# Patient Record
Sex: FEMALE | Race: BLACK OR AFRICAN AMERICAN | NOT HISPANIC OR LATINO | Employment: OTHER | URBAN - METROPOLITAN AREA
[De-identification: names, ages, dates, MRNs, and addresses within clinical notes are randomized per-mention and may not be internally consistent; named-entity substitution may affect disease eponyms.]

---

## 2017-02-15 ENCOUNTER — TELEPHONE (OUTPATIENT)
Dept: GASTROENTEROLOGY | Facility: CLINIC | Age: 67
End: 2017-02-15

## 2017-02-15 NOTE — TELEPHONE ENCOUNTER
Tried calling patient in regards to scheduling Colonoscopy,but the number that we have can not leave messages. Last procedure was 12/29/2014 pt was due for f/u 12/2016 for surveillance.

## 2017-04-18 ENCOUNTER — TELEPHONE (OUTPATIENT)
Dept: GASTROENTEROLOGY | Facility: CLINIC | Age: 67
End: 2017-04-18

## 2017-04-18 NOTE — TELEPHONE ENCOUNTER
Spoke with patient in regards to getting scheduled for follow up Colonoscopy from 12/2014. Patient stated that she is constipated and would like to have an office visit first with Dr. Perez. Patient is scheduled for an office visit on 05/05/17.

## 2017-05-05 ENCOUNTER — TELEPHONE (OUTPATIENT)
Dept: GASTROENTEROLOGY | Facility: CLINIC | Age: 67
End: 2017-05-05

## 2017-05-05 ENCOUNTER — OFFICE VISIT (OUTPATIENT)
Dept: GASTROENTEROLOGY | Facility: CLINIC | Age: 67
End: 2017-05-05
Payer: MEDICARE

## 2017-05-05 VITALS
HEIGHT: 64 IN | SYSTOLIC BLOOD PRESSURE: 150 MMHG | DIASTOLIC BLOOD PRESSURE: 75 MMHG | WEIGHT: 245 LBS | HEART RATE: 80 BPM | BODY MASS INDEX: 41.83 KG/M2

## 2017-05-05 DIAGNOSIS — Z86.010 HISTORY OF ADENOMATOUS POLYP OF COLON: Primary | ICD-10-CM

## 2017-05-05 DIAGNOSIS — K59.00 CONSTIPATION, UNSPECIFIED CONSTIPATION TYPE: Chronic | ICD-10-CM

## 2017-05-05 DIAGNOSIS — K59.00 CONSTIPATION, UNSPECIFIED CONSTIPATION TYPE: Primary | ICD-10-CM

## 2017-05-05 PROBLEM — E03.9 HYPOTHYROIDISM (ACQUIRED): Chronic | Status: ACTIVE | Noted: 2017-05-05

## 2017-05-05 PROBLEM — I10 HYPERTENSION, ESSENTIAL: Chronic | Status: ACTIVE | Noted: 2017-05-05

## 2017-05-05 PROCEDURE — 99999 PR PBB SHADOW E&M-EST. PATIENT-LVL III: CPT | Mod: PBBFAC,,, | Performed by: INTERNAL MEDICINE

## 2017-05-05 PROCEDURE — 3078F DIAST BP <80 MM HG: CPT | Mod: S$GLB,,, | Performed by: INTERNAL MEDICINE

## 2017-05-05 PROCEDURE — 1159F MED LIST DOCD IN RCRD: CPT | Mod: S$GLB,,, | Performed by: INTERNAL MEDICINE

## 2017-05-05 PROCEDURE — 3077F SYST BP >= 140 MM HG: CPT | Mod: S$GLB,,, | Performed by: INTERNAL MEDICINE

## 2017-05-05 PROCEDURE — 1157F ADVNC CARE PLAN IN RCRD: CPT | Mod: 8P,S$GLB,, | Performed by: INTERNAL MEDICINE

## 2017-05-05 PROCEDURE — 1160F RVW MEDS BY RX/DR IN RCRD: CPT | Mod: S$GLB,,, | Performed by: INTERNAL MEDICINE

## 2017-05-05 PROCEDURE — 1126F AMNT PAIN NOTED NONE PRSNT: CPT | Mod: S$GLB,,, | Performed by: INTERNAL MEDICINE

## 2017-05-05 PROCEDURE — 99214 OFFICE O/P EST MOD 30 MIN: CPT | Mod: S$GLB,,, | Performed by: INTERNAL MEDICINE

## 2017-05-05 NOTE — MR AVS SNAPSHOT
[facility]  [facility] [address] [place] [address]  Phone: [phone]  Fax: [phone]                  Varun Aleman   2017 1:20 PM   Office Visit    Description:  Female : 1950   Provider:  Delroy Perez MD   Department:  [facility]           Reason for Visit     Constipation     Colonoscopy           Diagnoses this Visit        Comments    History of adenomatous polyp of colon    -  Primary     Constipation, unspecified constipation type     Chronic problem, not tolerant of MiraLAX, not doing very well with only increased soluble fiber.           To Do List           Goals (5 Years of Data)     None      Follow-Up and Disposition     Return in about 6 months (around 2017), or if symptoms worsen or fail to improve.       These Medications        Disp Refills Start End    linaclotide (LINZESS) 145 mcg Cap capsule 60 capsule 1 2017     Take 1 capsule (145 mcg total) by mouth once daily. - Oral    Pharmacy: [facility] [place] [address] [facility] Ph #: [phone]         [facility]     [facility] On Call Nurse Care Line -  Assistance  Unless otherwise directed by your provider, please contact [facility], our nurse care line that is available for  assistance.     Registered nurses in the [facility] provide: appointment scheduling, clinical advisement, health education, and other advisory services.  Call: [phone] (toll free)               Medications           Message regarding Medications     Verify the changes and/or additions to your medication regime listed below are the same as discussed with your clinician today.  If any of these changes or additions are incorrect, please notify your healthcare provider.        START taking these NEW medications        Refills    linaclotide (LINZESS) 145 mcg Cap capsule 1    Sig: Take 1 capsule (145 mcg total) by mouth once  "daily.    Class: Normal    Route: Oral           Verify that the below list of medications is an accurate representation of the medications you are currently taking.  If none reported, the list may be blank. If incorrect, please contact your healthcare provider. Carry this list with you in case of emergency.           Current Medications     allopurinol (ZYLOPRIM) 100 MG tablet     amlodipine (NORVASC) 10 MG tablet     levothyroxine (SYNTHROID) 88 MCG tablet     linaclotide (LINZESS) 145 mcg Cap capsule Take 1 capsule (145 mcg total) by mouth once daily.    triamterene-hydrochlorothiazide 37.5-25 mg (MAXZIDE-25) 37.5-25 mg per tablet            Clinical Reference Information           Your Vitals Were     BP Pulse Height Weight BMI    150/75 (BP Location: Right arm, Patient Position: Sitting) 80 5' 4" (1.626 m) 111.1 kg (245 lb) 42.05 kg/m2      Blood Pressure          Most Recent Value    BP  (!)  150/75      Allergies as of 5/5/2017     No Known Allergies      Immunizations Administered on Date of Encounter - 5/5/2017     None      Orders Placed During Today's Visit      Normal Orders This Visit    Ambulatory Referral to External Surgery       [facility] Sign-Up     Activating your [facility] account is as easy as 1-2-3!     1) Visit [email or web address], select Sign Up Now, enter this activation code and your date of birth, then select Next.  [email or web address]  Expires: 6/19/2017  2:28 PM      2) Create a username and password to use when you visit [facility] in the future and select a security question in case you lose your password and select Next.    3) Enter your e-mail address and click Sign Up!    Additional Information  If you have questions, please e-mail [email or web address] or call [phone] to talk to our [facility] staff. Remember, [facility] is NOT to be used for urgent needs. For medical emergencies, dial 911.         Instructions    -Colonoscopy to be schedule at [facility]  --Last " colonoscopy occurred December 2014 with piecemeal removal of a sessile, adenomatous, polyp from the mid ascending colon  -Trial of Linzess 145 µg daily when necessary  -Follow-up office visit as needed, or in 6 months       Language Assistance Services     ATTENTION: Language assistance services are available, free of charge. Please call [phone].      ATENCIÓN: Si habla samantha, tiene a rivas disposición servicios gratuitos de asistencia lingüística. Llame al [phone].     CHÚ Ý: N?u b?n nói Ti?ng Vi?t, có các d?ch v? h? tr? ngôn ng? mi?n phí dành cho b?n. G?i s? [phone].         [facility] complies with applicable Federal civil rights laws and does not discriminate on the basis of race, color, national origin, age, disability, or sex.

## 2017-05-05 NOTE — TELEPHONE ENCOUNTER
Patient is scheduled for Colonoscopy at [facility] on 05/26/2017. Prep information was explained and given at the office visit.

## 2017-05-05 NOTE — PATIENT INSTRUCTIONS
-Colonoscopy to be schedule at [facility]  --Last colonoscopy occurred December 2014 with piecemeal removal of a sessile, adenomatous, polyp from the mid ascending colon  -Trial of Linzess 145 µg daily when necessary  -Follow-up office visit as needed, or in 6 months

## 2017-05-05 NOTE — PROGRESS NOTES
"[facility]  History & Physical / New Patient  [facility]      SUBJECTIVE:     PCP: Cliff Hui MD    Chief Complaint/Reason for Admission: Constipation and Colonoscopy      History of Present Illness:  Patient is a 66 y.o. female presents with with a long history of constipation complaints.  Additionally, the patient is status post a 29 December 2014 colonoscopy evaluation that resulted in piecemeal removal of a mid ascending colon, adenomatous polyp.  Her constipation is better with increased soluble fiber using Benefiber, but still causes her discomfort.  Trial of MiraLAX was not well-tolerated.  A prior Linzess trial was helpful.  She feels approximately once a week she "needs help" to have adequate bowel passage.  Currently she is having a bowel movement more or less once a day.  She denies any nausea, vomiting, heartburn, dysphagia, or odynophagia.  Results no social history of Nataliya blood pressure or melena.    I reviewed with her the results of the December 2014 colonoscopy evaluation that was notable for showing multiple diverticula, as well as a sessile polyp that was removed in piecemeal fashion.  The polyp measured 12 mm in size.  [employer] 4 reference.  Nonbleeding, excellent hemorrhoids were also noted.    On presentation today, the patient looks and feels well, and she is interested in getting a repeat trial of Linzess therapy, as well as completion of her follow-up colonoscopy.    Accompanied by: No one .    Outpatient Medications Prior to Visit   Medication Sig Dispense Refill    allopurinol (ZYLOPRIM) 100 MG tablet       amlodipine (NORVASC) 10 MG tablet       levothyroxine (SYNTHROID) 88 MCG tablet       triamterene-hydrochlorothiazide 37.5-25 mg (MAXZIDE-25) 37.5-25 mg per tablet        No facility-administered medications prior to visit.        Review of patient's allergies indicates:  No Known Allergies     Past Medical History:   Diagnosis Date " "   Gout     Hypertension      Past Surgical History:   Procedure Laterality Date    cordis exoseal      HYSTERECTOMY      THYROIDECTOMY, PARTIAL       History reviewed. No pertinent family history.  Social History   Substance Use Topics    Smoking status: Never Smoker    Smokeless tobacco: Never Used    Alcohol use Yes       Review of Systems:  Review of Systems   Constitutional: Negative for appetite change, chills, diaphoresis, fatigue, fever and unexpected weight change.   HENT: Negative for postnasal drip, trouble swallowing and voice change.    Eyes: Negative for visual disturbance.   Respiratory: Negative for cough, chest tightness, shortness of breath and wheezing.    Cardiovascular: Negative.    Gastrointestinal: Positive for constipation. Negative for abdominal distention, abdominal pain, anal bleeding, blood in stool, diarrhea, nausea, rectal pain and vomiting.   Endocrine: Negative for cold intolerance and polyuria.   Genitourinary: Negative for difficulty urinating, frequency, urgency and vaginal bleeding.   Musculoskeletal: Negative for arthralgias, back pain and gait problem.   Skin: Negative.    Allergic/Immunologic: Negative for environmental allergies and food allergies.   Neurological: Negative for seizures, speech difficulty and headaches.   Hematological: Does not bruise/bleed easily.   Psychiatric/Behavioral: Negative.          OBJECTIVE:     Vital Signs (Most Recent):  BP (!) 150/75 (BP Location: Right arm, Patient Position: Sitting)  Pulse 80  Ht 5' 4" (1.626 m)  Wt 111.1 kg (245 lb)  BMI 42.05 kg/m2    Physical Exam:  Physical Exam   Constitutional: She is oriented to person, place, and time. Vital signs are normal. She appears well-developed and well-nourished.   HENT:   Head: Normocephalic and atraumatic.   Right Ear: External ear normal.   Left Ear: External ear normal.   Nose: Nose normal.   Mouth/Throat: Uvula is midline and oropharynx is clear and moist. No oropharyngeal " exudate.   Eyes: Conjunctivae, EOM and lids are normal. Pupils are equal, round, and reactive to light. No scleral icterus.   Neck: Trachea normal. Neck supple. No JVD present. No tracheal tenderness and no muscular tenderness present. No tracheal deviation and no edema present. No thyromegaly present.   Cardiovascular: Normal rate, regular rhythm, normal heart sounds and normal pulses.  Exam reveals no S3 and no S4.    No murmur heard.  Pulmonary/Chest: Effort normal and breath sounds normal. No stridor.   Abdominal: Soft. Normal appearance and bowel sounds are normal. She exhibits no distension, no pulsatile liver, no fluid wave, no abdominal bruit, no pulsatile midline mass and no mass. There is no hepatosplenomegaly. There is no tenderness. There is no rebound and no guarding. No hernia.   Obese, nontender throughout examined area   Musculoskeletal: Normal range of motion. She exhibits no edema.   Neurological: She is alert and oriented to person, place, and time. She exhibits normal muscle tone. Coordination normal.   Skin: Skin is warm and dry. No petechiae and no rash noted. No cyanosis. Nails show no clubbing.   Psychiatric: She has a normal mood and affect. Her speech is normal and behavior is normal. Judgment normal. Cognition and memory are normal.   Nursing note and vitals reviewed.      Laboratory:  Complete Blood Count  No results found for: RBC, HGB, HCT, MCV, MCH, MCHC, RDW, PLT, MPV, GRAN, LYMPH, MONO, EOS, BASO, GRAN, LYMPH, MONO, EOSINOPHIL, BASOPHIL, DIFFMETHOD    Comprehensive Metabolic Panel  No results found for: GLU, BUN, CREATININE, NA, K, CL, PROT, ALBUMIN, BILITOT, AST, ALKPHOS, CO2, ALT, ANIONGAP, EGFRNONAA, ESTGFRAFRICA    TSH  No results found for: TSH    Diagnostic Results: -No available, pertinent imaging data      ASSESSMENT/PLAN:     Varun was seen today for constipation and colonoscopy.    Diagnoses and all orders for this visit:    History of adenomatous polyp of colon  -      Ambulatory Referral to External Surgery    Constipation, unspecified constipation type  Comments:  Chronic problem, not tolerant of MiraLAX, not doing very well with only increased soluble fiber.    Body mass index (BMI) of 40.1-44.9 in adult    Other orders  -     linaclotide (LINZESS) 145 mcg Cap capsule; Take 1 capsule (145 mcg total) by mouth once daily.        Plan:   Return in about 6 months (around 11/5/2017), or if symptoms worsen or fail to improve.    -Colonoscopy to be schedule at [facility]  --Last colonoscopy occurred December 2014 with piecemeal removal of a sessile, adenomatous, polyp from the mid ascending colon  -Trial of Linzess 145 µg daily when necessary  -Follow-up office visit as needed, or in 6 months        Delroy Perez MD, FACP, FACG, AGAF [facility]  [address], [address], [place] [address]  Phone: [phone] Fax: [phone]    [facility] [address], [address], [place] [address]  Phone: [phone] Fax: [phone]

## 2017-05-08 ENCOUNTER — TELEPHONE (OUTPATIENT)
Dept: GASTROENTEROLOGY | Facility: CLINIC | Age: 67
End: 2017-05-08

## 2017-05-08 NOTE — TELEPHONE ENCOUNTER
----- Message from Shonda Lehman sent at 5/8/2017 11:45 AM CDT -----  Contact: [phone]  Hannah asked her to call back before she scheduled the colonoscopy.

## 2017-05-08 NOTE — TELEPHONE ENCOUNTER
----- Message from Mignon Mosqueda sent at 5/8/2017  8:07 AM CDT -----  Contact: self [phone]  Wants to reschedule colonoscopy.

## 2017-05-08 NOTE — TELEPHONE ENCOUNTER
Spoke with patient and Colonoscopy is rescheduled for 6/2/17 at [facility]. Prep information was mailed out. SLSC was noted.

## 2017-05-08 NOTE — TELEPHONE ENCOUNTER
Spoke with patient in regards to rescheduling Colonoscopy at [facility] for 6/2/17, pt stated she will give the office a call back to confirm.

## 2017-05-18 ENCOUNTER — TELEPHONE (OUTPATIENT)
Dept: GASTROENTEROLOGY | Facility: CLINIC | Age: 67
End: 2017-05-18

## 2017-05-18 NOTE — TELEPHONE ENCOUNTER
Patient was scheduled for Colonoscopy on 06/02/2017 at [facility], called and canceled procedure does not have transportation.

## 2017-07-11 ENCOUNTER — TELEPHONE (OUTPATIENT)
Dept: GASTROENTEROLOGY | Facility: CLINIC | Age: 67
End: 2017-07-11

## 2017-07-11 NOTE — TELEPHONE ENCOUNTER
----- Message from Vida Garcia MA sent at 7/11/2017  8:26 AM CDT -----  Contact: [phone]  Hello,  Can you get this pt scheduled with Tano Moore in [place]? Pt would prefer to have her Colonoscopy in [place] with  in August.         ----- Message -----  From: Chel Stubbs  Sent: 7/10/2017   1:31 PM  To: Bj KUNZ Staff    Pt its requesting to schedule a colonoscopy . Pt of Dr Phan. Please advise

## 2017-07-11 NOTE — TELEPHONE ENCOUNTER
Patient called to reschedule Colonoscopy at [facility] for 8/10/17,prep information was explained and mailed out.

## 2017-07-11 NOTE — TELEPHONE ENCOUNTER
Spoke with pt and she stated that she would like to have her Colonoscopy done at [facility] in [place] with Dr. Davidson. Pt stated that she would like her procedure scheduled in August. Message sent to [place] Staff.               ----- Message from Chel Stubbs sent at 7/10/2017  1:31 PM CDT -----  Contact: [phone]  Pt its requesting to schedule a colonoscopy . Pt of Dr Phan. Please advise

## 2017-11-28 ENCOUNTER — TELEPHONE (OUTPATIENT)
Dept: OBSTETRICS AND GYNECOLOGY | Facility: CLINIC | Age: 67
End: 2017-11-28

## 2017-11-28 NOTE — TELEPHONE ENCOUNTER
Returned pt's call. Informed pt it's time for her annual visit. Pt is scheduled for 12/20. Patient verbalized understanding.

## 2017-11-28 NOTE — TELEPHONE ENCOUNTER
----- Message from Elizabeth Bess sent at 11/28/2017 11:40 AM CST -----  Contact: [phone]/self  Patient requesting orders for a mammogram. Please advise.

## 2017-12-20 ENCOUNTER — TELEPHONE (OUTPATIENT)
Dept: OBSTETRICS AND GYNECOLOGY | Facility: CLINIC | Age: 67
End: 2017-12-20

## 2017-12-20 DIAGNOSIS — Z12.31 SCREENING MAMMOGRAM, ENCOUNTER FOR: Primary | ICD-10-CM

## 2017-12-21 ENCOUNTER — TELEPHONE (OUTPATIENT)
Dept: GASTROENTEROLOGY | Facility: CLINIC | Age: 67
End: 2017-12-21

## 2017-12-21 NOTE — TELEPHONE ENCOUNTER
Patient recall was place under patient's chart for 5 year follow up Colonoscopy. Procedure note was mailed out to the patient.

## 2017-12-27 ENCOUNTER — TELEPHONE (OUTPATIENT)
Dept: GASTROENTEROLOGY | Facility: CLINIC | Age: 67
End: 2017-12-27

## 2017-12-27 NOTE — TELEPHONE ENCOUNTER
Spoke with patient and office visit was scheduled for 1/2/18, pt stated that she was going to wait to see .

## 2017-12-27 NOTE — TELEPHONE ENCOUNTER
----- Message from Chel Stubbs sent at 12/27/2017  8:29 AM CST -----  Contact: [phone]/ self   Pt its requesting an appointment today  , states her stomach its really swollen . Please advise

## 2018-01-02 ENCOUNTER — TELEPHONE (OUTPATIENT)
Dept: OBSTETRICS AND GYNECOLOGY | Facility: CLINIC | Age: 68
End: 2018-01-02

## 2018-01-02 ENCOUNTER — OFFICE VISIT (OUTPATIENT)
Dept: GASTROENTEROLOGY | Facility: CLINIC | Age: 68
End: 2018-01-02
Payer: MEDICARE

## 2018-01-02 VITALS
WEIGHT: 247 LBS | HEIGHT: 64 IN | BODY MASS INDEX: 42.17 KG/M2 | DIASTOLIC BLOOD PRESSURE: 82 MMHG | SYSTOLIC BLOOD PRESSURE: 160 MMHG | HEART RATE: 78 BPM

## 2018-01-02 DIAGNOSIS — K59.01 SLOW TRANSIT CONSTIPATION: Primary | ICD-10-CM

## 2018-01-02 DIAGNOSIS — Z86.010 HISTORY OF ADENOMATOUS POLYP OF COLON: ICD-10-CM

## 2018-01-02 PROCEDURE — 99999 PR PBB SHADOW E&M-EST. PATIENT-LVL III: CPT | Mod: PBBFAC,,, | Performed by: INTERNAL MEDICINE

## 2018-01-02 PROCEDURE — 99214 OFFICE O/P EST MOD 30 MIN: CPT | Mod: S$GLB,,, | Performed by: INTERNAL MEDICINE

## 2018-01-02 NOTE — PATIENT INSTRUCTIONS

## 2018-01-02 NOTE — PROGRESS NOTES
Subjective:      Patient ID: Varun Aleman is a 67 y.o. female.    Chief Complaint: Change in bowel habits    HPI:   Patient is a 67 y.o. female presents for GI follow-up.   She has a long history of constipation complaints.    Indicates she had some dyspeptic symptoms following [employer] food.  Some of her friends accompanying her had similar.  Indicates that food is quite greasy.  Also feel constipated.  Symptom relief obtained with prune juice.  Is now back to baseline.  Takes a Linzess but not on a daily basis.  Due to a history of rectal adenoma in 2014 showed normal colonoscopy in August 2017.  Diverticulosis and small internal hemorrhoids were noted.  Follow-up colonoscopy due in 2022      Review of patient's allergies indicates:  No Known Allergies  Past Medical History:   Diagnosis Date    Gout     Hypertension      Past Surgical History:   Procedure Laterality Date    cordis exoseal      HYSTERECTOMY      THYROIDECTOMY, PARTIAL       History reviewed. No pertinent family history.  Social History     Social History    Marital status:      Spouse name: N/A    Number of children: N/A    Years of education: N/A     Occupational History    Not on file.     Social History Main Topics    Smoking status: Never Smoker    Smokeless tobacco: Never Used    Alcohol use Yes    Drug use: No    Sexual activity: Not Currently     Other Topics Concern    Not on file     Social History Narrative    No narrative on file       Review of Systems:  Constitutional: Negative for appetite change.  Weight gain HENT: Negative for trouble swallowing.   Eyes: Negative for photophobia.   Respiratory: Negative for cough.  Occasional wheezing.   Cardiovascular: Negative for palpitations.   Gastrointestinal: See HPI for details.  Genitourinary: Negative for frequency and hematuria.   Skin: Negative for rash.   Neurological: Negative for weakness and headaches.   Hematological: Negative.  "  Psychiatric/Behavioral: Negative for suicidal ideas and behavioral problems.     Objective:     BP (!) 160/82 (BP Location: Right arm, Patient Position: Sitting)   Pulse 78   Ht 5' 4" (1.626 m)   Wt 112 kg (247 lb)   BMI 42.40 kg/m²     Physical Exam:  Eyes: Pupils are equal, round, and reactive to light.   Neck: Supple. No mass  Cardiovascular: Regular rhythm . No murmur   Pulmonary/Chest: Lungs clear   Abdominal: Soft.  Obese. Nontender, no guarding. Positive bowel sounds   Musculoskeletal: No deformity. No edema.   Psychiatric: Alert and oriented    Assessment:     No diagnosis found.  Plan:   Varun was seen today for change in bowel habits.    Diagnoses and all orders for this visit:    Slow transit constipation    History of adenomatous polyp of colon      Plan:  Continue current medications  Follow-up colonoscopy 2022  Follow-up when necessary    CC Dr. Hui    "

## 2018-01-09 ENCOUNTER — HOSPITAL ENCOUNTER (OUTPATIENT)
Dept: RADIOLOGY | Facility: HOSPITAL | Age: 68
Discharge: HOME | End: 2018-01-09
Attending: OBSTETRICS & GYNECOLOGY
Payer: MEDICARE

## 2018-01-09 DIAGNOSIS — Z12.31 SCREENING MAMMOGRAM, ENCOUNTER FOR: ICD-10-CM

## 2018-01-09 PROCEDURE — 77067 SCR MAMMO BI INCL CAD: CPT | Mod: TC,PO

## 2018-01-24 ENCOUNTER — OFFICE VISIT (OUTPATIENT)
Dept: OBSTETRICS AND GYNECOLOGY | Facility: CLINIC | Age: 68
End: 2018-01-24
Payer: MEDICARE

## 2018-01-24 VITALS
WEIGHT: 246 LBS | HEIGHT: 64 IN | BODY MASS INDEX: 42 KG/M2 | SYSTOLIC BLOOD PRESSURE: 140 MMHG | DIASTOLIC BLOOD PRESSURE: 68 MMHG

## 2018-01-24 DIAGNOSIS — Z12.39 SCREENING BREAST EXAMINATION: ICD-10-CM

## 2018-01-24 DIAGNOSIS — Z01.419 WELL WOMAN EXAM WITH ROUTINE GYNECOLOGICAL EXAM: Primary | ICD-10-CM

## 2018-01-24 PROCEDURE — 99999 PR PBB SHADOW E&M-EST. PATIENT-LVL II: CPT | Mod: PBBFAC,,, | Performed by: OBSTETRICS & GYNECOLOGY

## 2018-01-24 PROCEDURE — G0101 CA SCREEN;PELVIC/BREAST EXAM: HCPCS | Mod: S$GLB,,, | Performed by: OBSTETRICS & GYNECOLOGY

## 2018-01-24 RX ORDER — COLCHICINE 0.6 MG/1
TABLET, FILM COATED ORAL
COMMUNITY
Start: 2018-01-22

## 2018-01-24 NOTE — PROGRESS NOTES
CC: Annual check-up    SUBJECTIVE:   67 y.o. female   for annual routine Pap and checkup. No LMP recorded. Patient has had a hysterectomy..  She has no unusual complaints.        Past Medical History:   Diagnosis Date    Gout     Hypertension      Past Surgical History:   Procedure Laterality Date    cordis exoseal      HYSTERECTOMY      partial    THYROIDECTOMY, PARTIAL       Social History     Social History    Marital status:      Spouse name: N/A    Number of children: N/A    Years of education: N/A     Occupational History    Not on file.     Social History Main Topics    Smoking status: Never Smoker    Smokeless tobacco: Never Used    Alcohol use Yes    Drug use: No    Sexual activity: Not Currently     Other Topics Concern    Not on file     Social History Narrative    No narrative on file     History reviewed. No pertinent family history.  OB History    Para Term  AB Living   5 5 5     4   SAB TAB Ectopic Multiple Live Births           2      # Outcome Date GA Lbr Jovani/2nd Weight Sex Delivery Anes PTL Lv   5 Term      Vag-Spont      4 Term      Vag-Spont      3 Term      Vag-Spont      2 Term      Vag-Spont   ND   1 Term      Vag-Spont   DEC            Current Outpatient Prescriptions   Medication Sig Dispense Refill    allopurinol (ZYLOPRIM) 100 MG tablet       amlodipine (NORVASC) 10 MG tablet       COLCRYS 0.6 mg tablet       levothyroxine (SYNTHROID) 88 MCG tablet       linaclotide (LINZESS) 145 mcg Cap capsule Take 1 capsule (145 mcg total) by mouth once daily. 60 capsule 1     No current facility-administered medications for this visit.      Allergies: Patient has no known allergies.     ROS:  Constitutional: no weight loss, weight gain, fever, fatigue  Eyes:  No vision changes, glasses/contacts  ENT/Mouth: No ulcers, sinus problems, ears ringing, headache  Cardiovascular: No inability to lie flat, chest pain, exercise intolerance, swelling, heart  "palpitations  Respiratory: No wheezing, coughing blood, shortness of breath, or cough  Gastrointestinal: No diarrhea, bloody stool, nausea/vomiting, constipation, gas, hemorrhoids  Genitourinary: No blood in urine, painful urination, urgency of urination, frequency of urination, incomplete emptying, incontinence, abnormal bleeding, painful periods, heavy periods, vaginal discharge, vaginal odor, painful intercourse, sexual problems, bleeding after intercourse.  Musculoskeletal: No muscle weakness  Skin/Breast: No painful breasts, nipple discharge, masses, rash, ulcers  Neurological: No passing out, seizures, numbness, headache  Endocrine: No diabetes, hypothyroid, hyperthyroid, hot flashes, hair loss, abnormal hair growth, ance  Psychiatric: No depression, crying  Hematologic: No bruises, bleeding, swollen lymph nodes, anemia.      OBJECTIVE:   The patient appears well, alert, oriented x 3, in no distress.  BP (!) 140/68   Ht 5' 4" (1.626 m)   Wt 111.6 kg (246 lb)   BMI 42.23 kg/m²   NECK: no thyromegaly, trachea midline  SKIN: no acne, striae, hirsutism  BREAST EXAM: breasts appear normal, no suspicious masses, no skin or nipple changes or axillary nodes  ABDOMEN: obese and no hernias, masses, or hepatosplenomegaly  GENITALIA: normal external genitalia, no erythema, no discharge  URETHRA: normal urethra, normal urethral meatus  VAGINA: mucosal atrophy  CERVIX: absent  UTERUS: uterus absent  ADNEXA: no mass, fullness, tenderness      ASSESSMENT:   well woman  1. Well woman exam with routine gynecological exam    2. Screening breast examination        PLAN:   mammogram  return annually or prn     "

## 2018-10-16 DIAGNOSIS — R10.9 STOMACH PAIN: Primary | ICD-10-CM

## 2018-10-16 DIAGNOSIS — R10.2 ADNEXAL TENDERNESS, RIGHT: ICD-10-CM

## 2018-10-19 ENCOUNTER — NURSE TRIAGE (OUTPATIENT)
Dept: ADMINISTRATIVE | Facility: CLINIC | Age: 68
End: 2018-10-19

## 2018-10-19 ENCOUNTER — HOSPITAL ENCOUNTER (OUTPATIENT)
Dept: RADIOLOGY | Facility: HOSPITAL | Age: 68
Discharge: HOME | End: 2018-10-19
Attending: INTERNAL MEDICINE
Payer: MEDICARE

## 2018-10-19 ENCOUNTER — HOSPITAL ENCOUNTER (EMERGENCY)
Facility: HOSPITAL | Age: 68
Discharge: HOME | End: 2018-10-19
Attending: EMERGENCY MEDICINE
Payer: MEDICARE

## 2018-10-19 VITALS
DIASTOLIC BLOOD PRESSURE: 71 MMHG | HEIGHT: 64 IN | WEIGHT: 240 LBS | HEART RATE: 73 BPM | BODY MASS INDEX: 40.97 KG/M2 | OXYGEN SATURATION: 99 % | SYSTOLIC BLOOD PRESSURE: 154 MMHG | TEMPERATURE: 99 F | RESPIRATION RATE: 19 BRPM

## 2018-10-19 DIAGNOSIS — R10.2 ADNEXAL TENDERNESS, RIGHT: ICD-10-CM

## 2018-10-19 DIAGNOSIS — K29.70 GASTRITIS, PRESENCE OF BLEEDING UNSPECIFIED, UNSPECIFIED CHRONICITY, UNSPECIFIED GASTRITIS TYPE: ICD-10-CM

## 2018-10-19 DIAGNOSIS — R10.12 LUQ PAIN: Primary | ICD-10-CM

## 2018-10-19 DIAGNOSIS — R10.9 STOMACH PAIN: ICD-10-CM

## 2018-10-19 LAB
ALBUMIN SERPL BCP-MCNC: 4.1 G/DL
ALP SERPL-CCNC: 92 U/L
ALT SERPL W/O P-5'-P-CCNC: 13 U/L
ANION GAP SERPL CALC-SCNC: 11 MMOL/L
AST SERPL-CCNC: 13 U/L
BASOPHILS # BLD AUTO: ABNORMAL K/UL
BASOPHILS NFR BLD: 0 %
BILIRUB SERPL-MCNC: 0.4 MG/DL
BUN SERPL-MCNC: 10 MG/DL
CALCIUM SERPL-MCNC: 9.2 MG/DL
CHLORIDE SERPL-SCNC: 103 MMOL/L
CO2 SERPL-SCNC: 26 MMOL/L
CREAT SERPL-MCNC: 0.7 MG/DL
DIFFERENTIAL METHOD: ABNORMAL
EOSINOPHIL # BLD AUTO: ABNORMAL K/UL
EOSINOPHIL NFR BLD: 1 %
ERYTHROCYTE [DISTWIDTH] IN BLOOD BY AUTOMATED COUNT: 15.1 %
EST. GFR  (AFRICAN AMERICAN): >60 ML/MIN/1.73 M^2
EST. GFR  (NON AFRICAN AMERICAN): >60 ML/MIN/1.73 M^2
GLUCOSE SERPL-MCNC: 91 MG/DL
HCT VFR BLD AUTO: 44.8 %
HGB BLD-MCNC: 15.1 G/DL
LIPASE SERPL-CCNC: 20 U/L
LYMPHOCYTES # BLD AUTO: ABNORMAL K/UL
LYMPHOCYTES NFR BLD: 59 %
MCH RBC QN AUTO: 29.7 PG
MCHC RBC AUTO-ENTMCNC: 33.7 G/DL
MCV RBC AUTO: 88 FL
MONOCYTES # BLD AUTO: ABNORMAL K/UL
MONOCYTES NFR BLD: 4 %
NEUTROPHILS NFR BLD: 36 %
PLATELET # BLD AUTO: 301 K/UL
PLATELET BLD QL SMEAR: ABNORMAL
PMV BLD AUTO: 11.8 FL
POTASSIUM SERPL-SCNC: 3.2 MMOL/L
PROT SERPL-MCNC: 7.5 G/DL
RBC # BLD AUTO: 5.08 M/UL
SODIUM SERPL-SCNC: 140 MMOL/L
WBC # BLD AUTO: 7.17 K/UL

## 2018-10-19 PROCEDURE — 85007 BL SMEAR W/DIFF WBC COUNT: CPT

## 2018-10-19 PROCEDURE — 63600175 PHARM REV CODE 636 W HCPCS: Performed by: EMERGENCY MEDICINE

## 2018-10-19 PROCEDURE — 76700 US EXAM ABDOM COMPLETE: CPT | Mod: TC,PO

## 2018-10-19 PROCEDURE — 96375 TX/PRO/DX INJ NEW DRUG ADDON: CPT

## 2018-10-19 PROCEDURE — 99284 EMERGENCY DEPT VISIT MOD MDM: CPT | Mod: 25

## 2018-10-19 PROCEDURE — 76856 US EXAM PELVIC COMPLETE: CPT | Mod: TC,PO

## 2018-10-19 PROCEDURE — 83690 ASSAY OF LIPASE: CPT

## 2018-10-19 PROCEDURE — 85027 COMPLETE CBC AUTOMATED: CPT

## 2018-10-19 PROCEDURE — 96374 THER/PROPH/DIAG INJ IV PUSH: CPT

## 2018-10-19 PROCEDURE — 80053 COMPREHEN METABOLIC PANEL: CPT

## 2018-10-19 RX ORDER — SUCRALFATE 1 G/10ML
1 SUSPENSION ORAL 4 TIMES DAILY
Qty: 400 ML | Refills: 0 | Status: SHIPPED | OUTPATIENT
Start: 2018-10-19

## 2018-10-19 RX ORDER — INDOMETHACIN 50 MG/1
50 CAPSULE ORAL 2 TIMES DAILY WITH MEALS
COMMUNITY

## 2018-10-19 RX ORDER — MORPHINE SULFATE 2 MG/ML
6 INJECTION, SOLUTION INTRAMUSCULAR; INTRAVENOUS
Status: COMPLETED | OUTPATIENT
Start: 2018-10-19 | End: 2018-10-19

## 2018-10-19 RX ORDER — ONDANSETRON 2 MG/ML
4 INJECTION INTRAMUSCULAR; INTRAVENOUS
Status: COMPLETED | OUTPATIENT
Start: 2018-10-19 | End: 2018-10-19

## 2018-10-19 RX ADMIN — MORPHINE SULFATE 6 MG: 2 INJECTION, SOLUTION INTRAMUSCULAR; INTRAVENOUS at 02:10

## 2018-10-19 RX ADMIN — ONDANSETRON 4 MG: 2 INJECTION INTRAMUSCULAR; INTRAVENOUS at 02:10

## 2018-10-19 NOTE — DISCHARGE INSTRUCTIONS
Thank you for choosing [facility]! We appreciate you coming to us for your medical care. We hope you feel better soon! Please come back to [facility] for all of your future medical needs.      Sincerely,    Polo Hopson MD  Medical Director  Emergency Department

## 2018-10-19 NOTE — ED PROVIDER NOTES
Encounter Date: 10/19/2018    SCRIBE #1 NOTE: I, Renetta Cleveland, am scribing for, and in the presence of,  Dr. Box I have scribed the entire note.       History     Chief Complaint   Patient presents with    Abdominal Pain     Pain to LUQ that started saturday. Was seen by UC and PCP and was placed on abx without relief. Was advised to come into ED for further eval.      This is a 67 y.o. female who has a past medical history of Gout and Hypertension.   The patient presents to the Emergency Department with abdominal pain that started Saturday.  Symptoms are associated with LUQ pain that radiates to the middle of her back, nausea, and decreased appetite.  Pt denies vomiting.  Symptoms are relieved by antibiotics and steroids, but returns shortly after.   The patient states she was seen at an Urgent Care for abdominal pain where she was given antibiotics that temporarily relieved her pain. She states she was told it could be pancreatitis and to go to the ER if her symptoms worsened.   Pt has a past surgical history that includes Thyroidectomy, partial; cordis exoseal; and Hysterectomy.        The history is provided by the patient.     Review of patient's allergies indicates:  No Known Allergies  Past Medical History:   Diagnosis Date    Gout     Hypertension      Past Surgical History:   Procedure Laterality Date    cordis exoseal      HYSTERECTOMY      partial    THYROIDECTOMY, PARTIAL       History reviewed. No pertinent family history.  Social History     Tobacco Use    Smoking status: Never Smoker    Smokeless tobacco: Never Used   Substance Use Topics    Alcohol use: Yes    Drug use: No     Review of Systems   Constitutional: Positive for appetite change. Negative for chills and fever.   HENT: Negative for congestion, rhinorrhea and sore throat.    Eyes: Negative for redness and visual disturbance.   Respiratory: Negative for cough, shortness of breath and wheezing.    Cardiovascular: Negative for  chest pain and palpitations.   Gastrointestinal: Positive for abdominal pain and nausea. Negative for diarrhea and vomiting.   Genitourinary: Negative for dysuria and hematuria.   Musculoskeletal: Positive for back pain. Negative for myalgias and neck pain.   Skin: Negative for rash.   Neurological: Negative for dizziness, weakness and light-headedness.   Psychiatric/Behavioral: Negative for confusion.       Physical Exam     Initial Vitals [10/19/18 1233]   BP Pulse Resp Temp SpO2   (!) 206/100 77 19 98.4 °F (36.9 °C) 99 %      MAP       --         Physical Exam    Nursing note and vitals reviewed.  Constitutional: She appears well-developed and well-nourished. She is not diaphoretic. No distress.   HENT:   Head: Normocephalic and atraumatic.   Mouth/Throat: Oropharynx is clear and moist.   Eyes: Conjunctivae and EOM are normal. Pupils are equal, round, and reactive to light.   Cardiovascular: Normal rate, regular rhythm and normal heart sounds. Exam reveals no gallop and no friction rub.    No murmur heard.  Pulmonary/Chest: Breath sounds normal. She has no wheezes. She has no rhonchi. She has no rales.   Abdominal: Soft. Bowel sounds are normal. There is tenderness (reproducible in LUQ). There is no rebound and no guarding.   Musculoskeletal: Normal range of motion. She exhibits no edema or tenderness.   Neurological: She is alert and oriented to person, place, and time. She has normal strength.   Skin: Skin is warm and dry. Capillary refill takes less than 2 seconds. No rash noted.         ED Course   Procedures  Labs Reviewed   COMPREHENSIVE METABOLIC PANEL - Abnormal; Notable for the following components:       Result Value    Potassium 3.2 (*)     All other components within normal limits   CBC W/ AUTO DIFFERENTIAL - Abnormal; Notable for the following components:    RDW 15.1 (*)     Gran% 36.0 (*)     Lymph% 59.0 (*)     All other components within normal limits   LIPASE          Imaging Results    None           Medical Decision Making:   Initial Assessment:   Varun Aleman is a 67 y.o. female who presents with abdominal pain.    Differential Diagnosis:   Gastritis, ulcer, colitis, pancreatitis, doubt perforated viscus or surgical abdomen at this time.  Patient already has negative right upper quadrant ultrasound.  Clinical Tests:   Lab Tests: Ordered and Reviewed  ED Management:  Basic labs, lipase, pain control              Attending Attestation:             Attending ED Notes:   Workup unremarkable in the ED.  Patient reports 0 pain on re-evaluation.  I believe the patient likely has gastritis vs ulcer.   Diet rec's given. Rx for carafate.  F/U with GI as referred.  Rtn for any uncontrolled pain or for any other emergent concerns.             Clinical Impression:     1. LUQ pain    2. Gastritis, presence of bleeding unspecified, unspecified chronicity, unspecified gastritis type                               Polo Hopson MD  10/21/18 4642

## 2018-10-19 NOTE — ED NOTES
Neuro: AAOx3  HEENT: WDL  Resp: Airway patent, respirations even/unlabored. No SOB noted.  Cardiac: Skin pink/warm/dry, pulses intact. Pt denies any chest pain  Abdomen: Soft, tender to RUQ, Pt states RUQ pain since last Saturday. She states she has been to MD and clinic this week. She states she was put on antibiotics and has had no relief.  : No signs or symptoms of urinary disturbances  Musculoskeletal: Moves all extremities equally, ROM intact  Skin: Skin is dry and intact.

## 2018-10-20 NOTE — TELEPHONE ENCOUNTER
Anel from dr frazier's answering service called to say dr nash is on call for Dr frazier at [phone]  Spoke with tim with dr nash's office. MD on call will be notified to contact pt.     Reason for Disposition   Sounds like a life-threatening emergency to the triager    Protocols used: ST NEUROLOGIC DEFICIT-A-AH

## 2018-10-20 NOTE — TELEPHONE ENCOUNTER
ED 10/19  LM at 925pm at [phone]  Pt called re morphine shot- helped with pain. How long does shot lasts~ v8 hours per [email or web address], pt with multiple questions about morphine dosing- offered 24 hour pharmacy -per [email or web address] Opioid naive patients should be initiated on 10 mg morphine. nose and arm feels 'funny'. Feeling weak in arm- able to use arm. No numbness or tingling, no color change or temp change, + cap refill. rec EMS. Pt states that she doesn't think sx are related to stroke. Call back with questions.      Reason for Disposition   Sounds like a life-threatening emergency to the triager    Protocols used:  NEUROLOGIC DEFICIT-A-    Spoke with Anel at dr frazier's answering service. Requesting to speak with MD on call.  Anel states that she will MD on call call the pt.

## 2018-11-28 ENCOUNTER — OFFICE VISIT (OUTPATIENT)
Dept: NEUROLOGY | Facility: HOSPITAL | Age: 68
End: 2018-11-28
Attending: INTERNAL MEDICINE
Payer: MEDICARE

## 2018-11-28 VITALS
HEIGHT: 64 IN | BODY MASS INDEX: 41.69 KG/M2 | HEART RATE: 99 BPM | SYSTOLIC BLOOD PRESSURE: 164 MMHG | DIASTOLIC BLOOD PRESSURE: 73 MMHG | WEIGHT: 244.19 LBS | TEMPERATURE: 99 F

## 2018-11-28 DIAGNOSIS — R10.9 ABDOMINAL PAIN, UNSPECIFIED ABDOMINAL LOCATION: Primary | ICD-10-CM

## 2018-11-28 PROCEDURE — 99214 OFFICE O/P EST MOD 30 MIN: CPT | Performed by: INTERNAL MEDICINE

## 2018-11-28 RX ORDER — PREDNISONE 20 MG/1
TABLET ORAL
COMMUNITY
Start: 2018-11-08

## 2018-11-28 RX ORDER — HYDROCODONE BITARTRATE AND ACETAMINOPHEN 5; 325 MG/1; MG/1
TABLET ORAL
COMMUNITY
Start: 2018-10-30

## 2018-11-28 RX ORDER — OMEPRAZOLE 40 MG/1
40 CAPSULE, DELAYED RELEASE ORAL DAILY
Qty: 30 CAPSULE | Refills: 2 | Status: SHIPPED | OUTPATIENT
Start: 2018-11-28 | End: 2019-02-26

## 2018-11-28 RX ORDER — VALACYCLOVIR HYDROCHLORIDE 500 MG/1
TABLET, FILM COATED ORAL
COMMUNITY
Start: 2018-10-29

## 2018-11-28 RX ORDER — AMOXICILLIN 875 MG/1
TABLET, FILM COATED ORAL
COMMUNITY
Start: 2018-10-14

## 2018-12-07 ENCOUNTER — TELEPHONE (OUTPATIENT)
Dept: NEUROLOGY | Facility: HOSPITAL | Age: 68
End: 2018-12-07

## 2018-12-21 ENCOUNTER — TELEPHONE (OUTPATIENT)
Dept: NEUROLOGY | Facility: HOSPITAL | Age: 68
End: 2018-12-21

## 2018-12-21 NOTE — TELEPHONE ENCOUNTER
"Pt started Omeprazole as ordered.  Pt has been having brain fog(memory loss), cramps, back pain, hands achy and waking up in the middle of night hungry. Pt states"I know these are side effects of omeprazole.  I didn't have this before taking new medication". Pt notified symptoms given  are not normally attributed to omeprazole.  Pt advised to hold medication to allow symptoms to improve.  Pt notified her concerns will be forwarded to Dr. Morris for recommendations.    "

## 2018-12-21 NOTE — TELEPHONE ENCOUNTER
----- Message from Renetta Viramontes sent at 12/21/2018  9:12 AM CST -----  Contact: Patient  GI- Patient is having achy feelings, brain fog and iwaking up at night on Omeprazole. Please call the patient as she is worried the medication may be too strong. Call back number is [phone].

## 2018-12-27 ENCOUNTER — TELEPHONE (OUTPATIENT)
Dept: ORTHOPEDICS | Facility: CLINIC | Age: 68
End: 2018-12-27

## 2018-12-27 ENCOUNTER — HOSPITAL ENCOUNTER (OUTPATIENT)
Dept: RADIOLOGY | Facility: HOSPITAL | Age: 68
Discharge: HOME | End: 2018-12-27
Attending: ORTHOPAEDIC SURGERY
Payer: MEDICARE

## 2018-12-27 ENCOUNTER — OFFICE VISIT (OUTPATIENT)
Dept: ORTHOPEDICS | Facility: CLINIC | Age: 68
End: 2018-12-27
Payer: MEDICARE

## 2018-12-27 ENCOUNTER — TELEPHONE (OUTPATIENT)
Dept: NEUROLOGY | Facility: HOSPITAL | Age: 68
End: 2018-12-27

## 2018-12-27 VITALS — BODY MASS INDEX: 41.66 KG/M2 | HEIGHT: 64 IN | WEIGHT: 244 LBS

## 2018-12-27 DIAGNOSIS — M25.552 LEFT HIP PAIN: Primary | ICD-10-CM

## 2018-12-27 DIAGNOSIS — M25.559 ARTHRALGIA OF HIP, UNSPECIFIED LATERALITY: ICD-10-CM

## 2018-12-27 DIAGNOSIS — M53.87 SCIATICA ASSOCIATED WITH DISORDER OF LUMBOSACRAL SPINE: Primary | ICD-10-CM

## 2018-12-27 DIAGNOSIS — M25.552 LEFT HIP PAIN: ICD-10-CM

## 2018-12-27 PROCEDURE — 1101F PT FALLS ASSESS-DOCD LE1/YR: CPT | Mod: CPTII,S$GLB,, | Performed by: ORTHOPAEDIC SURGERY

## 2018-12-27 PROCEDURE — 99202 OFFICE O/P NEW SF 15 MIN: CPT | Mod: S$GLB,,, | Performed by: ORTHOPAEDIC SURGERY

## 2018-12-27 PROCEDURE — 99999 PR PBB SHADOW E&M-EST. PATIENT-LVL II: CPT | Mod: PBBFAC,,, | Performed by: ORTHOPAEDIC SURGERY

## 2018-12-27 PROCEDURE — 73502 X-RAY EXAM HIP UNI 2-3 VIEWS: CPT | Mod: 26,LT,, | Performed by: RADIOLOGY

## 2018-12-27 PROCEDURE — 73502 X-RAY EXAM HIP UNI 2-3 VIEWS: CPT | Mod: TC,PN,LT

## 2018-12-27 NOTE — TELEPHONE ENCOUNTER
Pt notified, per Dr. Morris, symptoms she is having are not from Omeprazole.  Pt notified she can stop taking if concerned.  Pt notified to call clinic to schedule and edg if abdominal pain recurs.  Pt states she went to urgent care and was given pain meds and advised to go to bone doctor.  Pt insists symptoms are side effects of omeprazole. Pt stopped taking omeprazole on 12/21/18.  Pt acknowledged understanding.

## 2018-12-27 NOTE — PROGRESS NOTES
Subjective:      Patient ID: Varun Aleman is a 68 y.o. female.    Chief Complaint: Pain of the Left Hip    HPI      Varun Aleman is seen for evaluation and treatment of hip pain.  They have experienced problems with their left hip over the past 1 week Pain is located laterally.  Is starts the lower back and buttock and radiates down to the foot. They have been treated with Prednisone.   Symptoms have recently improved. Ambulation reportedly has not been impaired. Self care ADLs are not painful.  Patient has intermittent numbness.  She denies motor symptoms and incontinence.    Review of Systems   Constitution: Negative for fever and weight loss.   HENT: Negative for congestion.    Eyes: Negative for visual disturbance.   Cardiovascular: Negative for chest pain.   Respiratory: Negative for shortness of breath.    Hematologic/Lymphatic: Negative for bleeding problem. Does not bruise/bleed easily.   Skin: Negative for poor wound healing.   Musculoskeletal: Positive for back pain and joint pain.   Gastrointestinal: Negative for abdominal pain.   Genitourinary: Negative for dysuria.   Neurological: Negative for seizures.   Psychiatric/Behavioral: Negative for altered mental status.   Allergic/Immunologic: Negative for persistent infections.         Objective:            Ortho/SPM Exam  Left hip    The patient is not in acute distress.   Body habitus is:obese.   Sclerae normal  The patient walks without a limp.   none  The skin over the hip is:intact.   There is:no local tenderness.   Range of motion- Flexion 100, External rotation 35, internal rotation 30.  Resisted SLR negative.  Pain with rotation negative  Sciatic tension findings positive.  Shortening/lengthening compared to the contralateral side absent.  Pulses DP present, PT present.  Motor normal 5/5 strength in all tested muscle groups.   Sensory normal.    I reviewed the relevant radiographic images for the patient's condition:  Recent  left hip films are normal for the patient's age          Assessment:       Encounter Diagnoses   Name Primary?    Sciatica associated with disorder of lumbosacral spine Yes    Arthralgia of hip, unspecified laterality           Plan:       Varun was seen today for pain.    Diagnoses and all orders for this visit:    Sciatica associated with disorder of lumbosacral spine    Arthralgia of hip, unspecified laterality      I explained my diagnostic impression and the reasoning behind it in detail, using layman's terms.  Models and/or pictures were used to help in the explanation.    Prednisone taper explained    Activity modification explained    Natural history explained in detail

## 2018-12-27 NOTE — LETTER
12/27/2018    To whom it may concern:    Varun Aleman is under my medical care. She was seen today and may return to work/school on Tomorrow with the following restrictions:  Allow patient to work 4 hr per day for the next 2 weeks if her sciatic is persistent.    Yours truly,        Farhan Wright MD

## 2019-01-15 DIAGNOSIS — Z12.31 SCREENING MAMMOGRAM, ENCOUNTER FOR: Primary | ICD-10-CM

## 2019-01-25 ENCOUNTER — HOSPITAL ENCOUNTER (OUTPATIENT)
Dept: RADIOLOGY | Facility: HOSPITAL | Age: 69
Discharge: HOME | End: 2019-01-25
Attending: INTERNAL MEDICINE
Payer: MEDICARE

## 2019-01-25 DIAGNOSIS — Z12.31 SCREENING MAMMOGRAM, ENCOUNTER FOR: ICD-10-CM

## 2019-01-25 PROCEDURE — 77067 SCR MAMMO BI INCL CAD: CPT | Mod: TC,PO

## 2019-06-21 DIAGNOSIS — R10.9 STOMACH ACHE: ICD-10-CM

## 2019-06-21 DIAGNOSIS — K57.92 DIVERTICULITIS: Primary | ICD-10-CM

## 2019-07-02 ENCOUNTER — HOSPITAL ENCOUNTER (OUTPATIENT)
Dept: RADIOLOGY | Facility: HOSPITAL | Age: 69
Discharge: HOME | End: 2019-07-02
Attending: INTERNAL MEDICINE
Payer: MEDICARE

## 2019-07-02 DIAGNOSIS — K57.92 DIVERTICULITIS: ICD-10-CM

## 2019-07-02 DIAGNOSIS — R10.9 STOMACH ACHE: ICD-10-CM

## 2019-07-02 PROCEDURE — 74160 CT ABDOMEN W/CONTRAST: CPT | Mod: TC,PO

## 2019-07-02 PROCEDURE — 25500020 PHARM REV CODE 255: Mod: PO | Performed by: INTERNAL MEDICINE

## 2019-07-02 RX ADMIN — IOHEXOL 15 ML: 300 INJECTION, SOLUTION INTRAVENOUS at 10:07

## 2019-07-02 RX ADMIN — IOHEXOL 75 ML: 350 INJECTION, SOLUTION INTRAVENOUS at 10:07

## 2020-02-21 ENCOUNTER — HOSPITAL ENCOUNTER (OUTPATIENT)
Dept: RADIOLOGY | Facility: HOSPITAL | Age: 70
Discharge: HOME | End: 2020-02-21
Attending: INTERNAL MEDICINE
Payer: MEDICARE

## 2020-02-21 DIAGNOSIS — Z12.31 ENCOUNTER FOR SCREENING MAMMOGRAM FOR BREAST CANCER: ICD-10-CM

## 2020-02-21 PROCEDURE — 77067 SCR MAMMO BI INCL CAD: CPT | Mod: TC,PO

## 2020-03-10 ENCOUNTER — HOSPITAL ENCOUNTER (OUTPATIENT)
Dept: RADIOLOGY | Facility: HOSPITAL | Age: 70
Discharge: HOME | End: 2020-03-10
Attending: INTERNAL MEDICINE
Payer: MEDICARE

## 2020-03-10 DIAGNOSIS — G44.52 NEW DAILY PERSISTENT HEADACHE: ICD-10-CM

## 2020-03-10 PROCEDURE — 70450 CT HEAD/BRAIN W/O DYE: CPT | Mod: TC,PO

## 2020-06-05 NOTE — TELEPHONE ENCOUNTER
NEUROLOGY NOTE        Assessment/Plan          Epilepsy. We didn't get outside records, and he deferred EEG study. Will check drug level on Depakote, Keppra, Lamictal, and zonisamide, LFT, and CBC. F/u in 6 months.    Cerebral palsy with mental retardation no and spastic armida-paresis. But seems to be highly functional but needs supervision for daily routines. No falls or injuries.    Obesity encouraged to exercise and diet control.    Left blindness.    Psych issue need to follow-up with psychiatrist.           SUBJECTIVE       Tre Vazquez is a 43 year old male seen for ***.      The patient came with care manager from the group home where he now resides.    He has a history of cerebral palsy, epilepsy, psychiatric condition. He is his own power of  but has his mom visited him regularly.    Is legally blind in the left.    Epilepsy: He could not remember which neurologist he sees unaware he used has been managed. But he used to follow up neurologist regularly. The new group home care manager does not have a whole lot of information by reviewing his history he realized that he needs to see a neurologist.    Seizure medications: Could not remember what type of seizure he has had and went with the last seizure spells. But he is taking  lamotrigine 100 mg in the morning and 400 at bedtime.   Keppra 500 mg 2 tablets in the morning and 3 tablets in the evening.   levocarnitine 330 mg 1 tablet 3 times a day.   zonisamide 100 mg 2 tablets at bedtime.      He has obesity but not exercising regularly. Eating well.    Psych: He has a history of psychiatric condition including oppositional defiant disorder, depression, PTSD. Mild intellectual disability, psychosis. Is a follow-up with a psychologist regularly.    Cerebral palsy: According to no documentation he had cerebral ventriculomegaly, cerebral palsy with mental retardation and spastic diplegia. He has some baseline of hand tremor as well. But he is living  ----- Message from Nakia Kim sent at 12/21/2017 10:41 AM CST -----  Contact: self  Patient called to request a letter about test results from her colonoscopy to be sent to her    in a group home able to take care of himself independently except that he needs supervision of daily routines.    Psych medication: Is on BuSpar 10 mg twice a day, citalopram 40 mg a day.    Otherwise he has obesity, he is prediabetic, has asthma.    HCT 2/2018 stable compared to 1 month ago, enlarged ventricles. Otherwise NAD.           Problem List     Patient Active Problem List    Diagnosis Date Noted     Advanced directives, counseling/discussion 08/02/2012     Priority: High     Polst completed at [facility] 7/30/2012       Depression 03/10/2014     Priority: Medium     History of sepsis 12/10/2013     Priority: Medium     Septic shock (H) 11/27/2013     Priority: Medium     Problem list name updated by automated process. Provider to review       Pneumococcal septicemia(038.2) (H) 11/27/2013     Priority: Medium     Non-refractory epilepsy (H) 07/18/2012     Priority: Medium     Cerebral palsy (H) 07/18/2012     Priority: Medium     Encephalomalacia 07/18/2012     Priority: Medium     Benign hypertension 07/18/2012     Priority: Medium         Past medical history     Past Surgical History:   Procedure Laterality Date     ORTHOPEDIC SURGERY      pt stated past surgery on both ankles       Past Medical History:   Diagnosis Date     Blindness of left eye      Depression 3/10/2014     Hypertension      Pneumococcal septicemia(038.2) (H) 11/26/2013    Hospitalized     Pneumonia, organism unspecified(486) 11/26/2013    Hospitalized     Unspecified epilepsy without mention of intractable epilepsy          Family history     History reviewed. No pertinent family history.      Social history     Social History     Socioeconomic History     Marital status: Single     Spouse name: Not on file     Number of children: Not on file     Years of education: Not on file     Highest education level: Not on file   Occupational History     Not on file   Social Needs     Financial resource strain: Not on file     Food insecurity      Worry: Not on file     Inability: Not on file     Transportation needs     Medical: Not on file     Non-medical: Not on file   Tobacco Use     Smoking status: Former Smoker     Smokeless tobacco: Never Used   Substance and Sexual Activity     Alcohol use: No     Drug use: No     Sexual activity: Never   Lifestyle     Physical activity     Days per week: Not on file     Minutes per session: Not on file     Stress: Not on file   Relationships     Social connections     Talks on phone: Not on file     Gets together: Not on file     Attends Ten[employer] service: Not on file     Active member of club or organization: Not on file     Attends meetings of clubs or organizations: Not on file     Relationship status: Not on file     Intimate partner violence     Fear of current or ex partner: Not on file     Emotionally abused: Not on file     Physically abused: Not on file     Forced sexual activity: Not on file   Other Topics Concern     Parent/sibling w/ CABG, MI or angioplasty before 65F 55M? Not Asked   Social History Narrative     Not on file         Allergy     Tomato and Vicodin [hydrocodone-acetaminophen]    MEDICATIONS List     Current Outpatient Medications   Medication Sig Dispense Refill     acetaminophen (TYLENOL) 500 MG tablet Take 500 mg by mouth every 4 hours as needed       ARTIFICIAL TEARS 0.1-0.3 % SOLN Apply 2 drops to eye every hour. As needed. 1 Bottle 11     citalopram (CELEXA) 20 MG tablet Take 1 tablet (20 mg) by mouth daily 30 tablet 5     clonazePAM (KLONOPIN) 0.5 MG tablet Take 1 tablet (0.5 mg) by mouth 3 times daily 90 tablet 1     divalproex (DEPAKOTE ER) 500 MG 24 hr tablet Take 1 tablet (500 mg) by mouth daily 500mg am every am 1000mg every pm 90 tablet 3     lamoTRIgine (LAMICTAL) 100 MG tablet Take 2 tablets (200 mg) by mouth daily In the AM 60 tablet 3     lamoTRIgine (LAMICTAL) 200 MG tablet Take 2 tablets (400 mg) by mouth daily IN the PM 60 tablet 3     levETIRAcetam (KEPPRA) 500 MG  "tablet Take 3 tablets (1,500 mg) by mouth 2 times daily 540 tablet 1     levOCARNitine (CARNITOR) 330 MG tablet Take by mouth 3 times daily (with meals)       lisinopril (PRINIVIL,ZESTRIL) 20 MG tablet Take 1 tablet (20 mg) by mouth daily 90 tablet 1     multivitamin, therapeutic with minerals (MULTI-VITAMIN) TABS Take 1 tablet by mouth daily. 100 tablet 3     zonisamide (ZONEGRAN) 100 MG capsule Take 2 capsules (200 mg) by mouth daily 60 capsule 3           Review of system     ***    PHYSICAL EXAMINATION     Vital signs in last 24 hours:  Vitals:    06/05/20 1030   Weight: 129.3 kg (285 lb)   Height: 1.702 m (5' 7\")       ***        RESULTS REVIEW         Siobhan Ballard MD, MD, PhD  Neurology   Office tel: [phone]      "

## 2020-06-22 ENCOUNTER — HOSPITAL ENCOUNTER (OUTPATIENT)
Dept: RADIOLOGY | Facility: HOSPITAL | Age: 70
Discharge: HOME | End: 2020-06-22
Attending: INTERNAL MEDICINE
Payer: MEDICARE

## 2020-06-22 DIAGNOSIS — K11.21 ACUTE PAROTITIS: ICD-10-CM

## 2020-06-22 PROCEDURE — 76536 US EXAM OF HEAD AND NECK: CPT | Mod: TC,PO

## 2020-11-27 ENCOUNTER — TELEPHONE (OUTPATIENT)
Dept: ORTHOPEDICS | Facility: CLINIC | Age: 70
End: 2020-11-27

## 2020-11-27 NOTE — TELEPHONE ENCOUNTER
Spoke with patient. Informed that the last time patient saw Dr Wright was in 2018 and he saw her for her hip. He did not give her an injection on her foot.

## 2020-11-27 NOTE — TELEPHONE ENCOUNTER
----- Message from Loren Gabriel sent at 11/27/2020  4:46 PM CST -----  Contact: Patient  Type:  Needs Medical Advice    Who Called: Patient  Symptoms (please be specific): in hospital for gout   How long has patient had these symptoms:  would like to know the name of injection given so she can get another one in the hospital if possible because she is in pain gout is all around her ankle    Would the patient rather a call back or a response via [facility]?call  Best Call Back Number:  [phone] Kavitha  daughter [phone]  Additional Information:

## 2020-11-30 ENCOUNTER — TELEPHONE (OUTPATIENT)
Dept: NEUROLOGY | Facility: HOSPITAL | Age: 70
End: 2020-11-30

## 2020-11-30 NOTE — TELEPHONE ENCOUNTER
----- Message from Loren Gabriel sent at 11/27/2020  4:57 PM CST -----  Type:  Needs Medical Advice    Who Called: Patient  Symptoms (please be specific): in hospital for gout   How long has patient had these symptoms:  would like to know the name of injection given so she can get another one in the hospital if possible because she is in pain gout is all around her ankle    Would the patient rather a call back or a response via [facility]?call  Best Call Back Number:  [phone] Kavitha  daughter [phone]  Additional Information:   Patient is in the hospital in [place]

## 2020-11-30 NOTE — TELEPHONE ENCOUNTER
Returned Kavitha, daughter's call.  Information not needed at this time.  Pt in [facility], wanted to know what shot was given to pt for gout previously.

## 2024-01-19 ENCOUNTER — OFFICE VISIT (OUTPATIENT)
Dept: URBAN - METROPOLITAN AREA CLINIC 2 | Facility: CLINIC | Age: 74
End: 2024-01-19

## 2024-01-26 ENCOUNTER — OFFICE VISIT (OUTPATIENT)
Dept: URBAN - METROPOLITAN AREA TELEHEALTH 2 | Facility: TELEHEALTH | Age: 74
End: 2024-01-26

## 2024-01-26 ENCOUNTER — DASHBOARD ENCOUNTERS (OUTPATIENT)
Age: 74
End: 2024-01-26

## 2024-01-26 ENCOUNTER — TELEPHONE ENCOUNTER (OUTPATIENT)
Dept: URBAN - METROPOLITAN AREA CLINIC 80 | Facility: CLINIC | Age: 74
End: 2024-01-26

## 2024-01-26 ENCOUNTER — OFFICE VISIT (OUTPATIENT)
Dept: URBAN - METROPOLITAN AREA TELEHEALTH 2 | Facility: TELEHEALTH | Age: 74
End: 2024-01-26
Payer: MEDICARE

## 2024-01-26 VITALS — WEIGHT: 159 LBS | BODY MASS INDEX: 27.14 KG/M2 | HEIGHT: 64 IN

## 2024-01-26 DIAGNOSIS — K58.1 IRRITABLE BOWEL SYNDROME WITH CONSTIPATION: ICD-10-CM

## 2024-01-26 PROBLEM — 440630006: Status: ACTIVE | Noted: 2024-01-26

## 2024-01-26 PROCEDURE — 99442 PHONE E/M BY PHYS 11-20 MIN: CPT | Performed by: NURSE PRACTITIONER

## 2024-01-26 RX ORDER — COLCHICINE 0.6 MG/1
1 CAPSULE CAPSULE ORAL
Status: ACTIVE | COMMUNITY

## 2024-01-26 RX ORDER — ROSUVASTATIN CALCIUM 20 MG/1
1 TABLET TABLET, COATED ORAL ONCE A DAY
Status: ACTIVE | COMMUNITY

## 2024-01-26 RX ORDER — VALSARTAN 80 MG/1
1 TABLET TABLET, FILM COATED ORAL ONCE A DAY
Status: ACTIVE | COMMUNITY

## 2024-01-26 RX ORDER — LINACLOTIDE 145 UG/1
1 CAPSULE AT LEAST 30 MINUTES BEFORE THE FIRST MEAL OF THE DAY ON AN EMPTY STOMACH CAPSULE, GELATIN COATED ORAL ONCE A DAY
Status: ACTIVE | COMMUNITY

## 2024-01-26 RX ORDER — METOPROLOL SUCCINATE 25 MG/1
1 TABLET TABLET, FILM COATED, EXTENDED RELEASE ORAL ONCE A DAY
Status: ACTIVE | COMMUNITY

## 2024-01-26 NOTE — HPI-TODAY'S VISIT:
on linzess 145mcg but does not work every day on since October continues fiber and activia has hx diverticulitis-no pain now last colonoscopy 2023-has had polyps does have hypothyroidism
